# Patient Record
Sex: FEMALE | Race: WHITE | NOT HISPANIC OR LATINO | URBAN - METROPOLITAN AREA
[De-identification: names, ages, dates, MRNs, and addresses within clinical notes are randomized per-mention and may not be internally consistent; named-entity substitution may affect disease eponyms.]

---

## 2018-03-05 DIAGNOSIS — Z29.8 NEED FOR MALARIA PROPHYLAXIS: Primary | ICD-10-CM

## 2018-03-05 RX ORDER — ATOVAQUONE AND PROGUANIL HYDROCHLORIDE 250; 100 MG/1; MG/1
TABLET, FILM COATED ORAL
Qty: 19 TABLET | Refills: 0 | Status: SHIPPED | OUTPATIENT
Start: 2018-03-05 | End: 2023-03-07

## 2018-04-12 ENCOUNTER — OFFICE VISIT (OUTPATIENT)
Dept: SPORTS MEDICINE | Facility: CLINIC | Age: 28
End: 2018-04-12

## 2018-04-12 VITALS
SYSTOLIC BLOOD PRESSURE: 112 MMHG | WEIGHT: 142.8 LBS | DIASTOLIC BLOOD PRESSURE: 64 MMHG | BODY MASS INDEX: 24.38 KG/M2 | HEART RATE: 59 BPM | HEIGHT: 64 IN | OXYGEN SATURATION: 99 %

## 2018-04-12 DIAGNOSIS — G89.29 CHRONIC LEFT-SIDED THORACIC BACK PAIN: Primary | ICD-10-CM

## 2018-04-12 DIAGNOSIS — M99.08 SOMATIC DYSFUNCTION OF RIB: ICD-10-CM

## 2018-04-12 DIAGNOSIS — M54.6 CHRONIC LEFT-SIDED THORACIC BACK PAIN: Primary | ICD-10-CM

## 2018-04-12 DIAGNOSIS — M99.02 THORACIC REGION SOMATIC DYSFUNCTION: ICD-10-CM

## 2018-04-12 PROCEDURE — 99204 OFFICE O/P NEW MOD 45 MIN: CPT | Performed by: FAMILY MEDICINE

## 2018-04-12 PROCEDURE — 71101 X-RAY EXAM UNILAT RIBS/CHEST: CPT | Performed by: FAMILY MEDICINE

## 2018-04-12 PROCEDURE — 98925 OSTEOPATH MANJ 1-2 REGIONS: CPT | Performed by: FAMILY MEDICINE

## 2018-04-12 NOTE — PROGRESS NOTES
"Allyson is a 28 y.o. year old female    Chief Complaint   Patient presents with   • Thoracic Spine - Pain       History of Present Illness  Thoracic back pain: Acute on chronic.  Has been bothering her for the past year with no known trauma.  Primarily left sided and bad enough that it catches her breath.  She states that today's episode occurred as she was picking up her youngest daughter and then putting her down.  Pain wraps around to the front of her chest.  She states in the previous episodes, and will typically go away with rest within a few days.  No family history of autoimmune conditions.    I have reviewed the patient's medical history in detail and updated the computerized patient record.    Review of Systems   Constitutional: Negative for chills and fever.   Respiratory: Negative for chest tightness, shortness of breath and wheezing.    Cardiovascular: Negative for chest pain.   Musculoskeletal:        Per HPI   Neurological: Negative for weakness, numbness and headaches.   All other systems reviewed and are negative.      /64   Pulse 59   Ht 162.6 cm (64\")   Wt 64.8 kg (142 lb 12.8 oz)   SpO2 99%   BMI 24.51 kg/m²     Physical Exam    Vital signs reviewed.   General: No acute distress.  Eyes: conjunctiva clear; pupils equally round and reactive  ENT: external ears and nose atraumatic; oropharynx clear  CV: no peripheral edema, 2+ radial pulse  Resp: normal respiratory effort, no use of accessory muscles  Skin: no rashes or wounds; normal turgor  Psych: mood and affect appropriate; recent and remote memory intact  Neuro: Sensation to light touch intact; 2+ DTR L4, S1 bilateral    Osteopathic exam:  Tissue texture changes thoracic spine, ribs  T6-7 neutral, side bent right and rotated left  Left posterior rib 6, 7    Chest X-Ray  Indication: pain  Views: PA and ribs    Findings:  Normal lung markings.  No pleural effusion.  Heart size and shape are normal.  Mediastinum is normal.  No visible " rib fractures.   Overall, normal chest.    There were no previous studies available for comparison.    Procedure: osteopathic manipulative treatment    Risks, benefits and alternatives discussed with patient and verbal consent obtained. Area of maximal tenderness palpated and documented above. Treatment included: counterstrain, muscle energy, HVLA and myofascial release. Patient tolerated treatment well and demonstrated decreased tenderness, improved range of motion. Post treatment instructions given verbally.    Diagnoses and all orders for this visit:    Chronic left-sided thoracic back pain  -     XR Ribs Left With PA Chest    Thoracic region somatic dysfunction    Somatic dysfunction of rib      Reduce pain and improved range of motion following OMT.  Home exercise program thoracic back pain given.  Follow-up when necessary.

## 2023-03-07 ENCOUNTER — OFFICE VISIT (OUTPATIENT)
Dept: SPORTS MEDICINE | Facility: CLINIC | Age: 33
End: 2023-03-07

## 2023-03-07 VITALS
HEART RATE: 65 BPM | WEIGHT: 140 LBS | SYSTOLIC BLOOD PRESSURE: 116 MMHG | DIASTOLIC BLOOD PRESSURE: 80 MMHG | OXYGEN SATURATION: 97 % | BODY MASS INDEX: 23.9 KG/M2 | HEIGHT: 64 IN | TEMPERATURE: 97.8 F

## 2023-03-07 DIAGNOSIS — S62.609A: Primary | ICD-10-CM

## 2023-03-07 DIAGNOSIS — M25.532 LEFT WRIST PAIN: ICD-10-CM

## 2023-03-07 PROCEDURE — 99204 OFFICE O/P NEW MOD 45 MIN: CPT | Performed by: FAMILY MEDICINE

## 2023-03-07 RX ORDER — CELECOXIB 100 MG/1
100 CAPSULE ORAL 2 TIMES DAILY PRN
Qty: 60 CAPSULE | Refills: 0 | Status: SHIPPED | OUTPATIENT
Start: 2023-03-07

## 2023-03-07 NOTE — PROGRESS NOTES
"Allyson is a 33 y.o. year old female presents to [facility]    Chief Complaint   Patient presents with   • Wrist Pain     LT WRIST-hand got \"crushed\" spotting someone. 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  She was stunting and an athlete was falling as she was holding athlete up.  Unfortunately, this resulted in her left hand, wrist being hyperextended.  She felt an immediate pop.  Initial pain began in the ring finger.  She has since developed swelling along the back of the hand below the middle finger.  She does associate change in temperature of the hand as it feels \"cold\".  She does have full sensation of the tips of her fingers.  She has applied ice, taking ibuprofen.    I have reviewed the patient's medical, family, and social history in detail and updated the computerized patient record.    /80 (BP Location: Right arm, Patient Position: Sitting, Cuff Size: Adult)   Pulse 65   Temp 97.8 °F (36.6 °C) (Temporal)   Ht 162.6 cm (64.02\")   Wt 63.5 kg (140 lb)   SpO2 97%   BMI 24.02 kg/m²      Physical Exam    Mask worn thru encounter  Vital signs reviewed.   General: No acute distress.  Eyes: conjunctiva clear; pupils equally round and reactive  ENT: external ears atraumatic  CV: no peripheral edema  Resp: normal respiratory effort, no use of accessory muscles  Skin: no rashes or wounds; normal turgor  Psych: mood and affect appropriate; recent and remote memory intact  Neuro: sensation to light touch intact    MSK Exam  L hand, wrist: Neurovascular intact.  2+ pulses at the radial and ulnar arteries.  She does lack full motion of the hand secondary to pain and swelling along the dorsum of the third, fourth metacarpals.  There is bony tenderness along the fourth and fifth proximal phalanx.  No ecchymoses.  3/5  strength.    Left Wrist X-Ray  Indication: Pain  Views: AP, Lateral, " and Oblique    Findings:  Nondisplaced fracture of the proximal fourth, fifth phalanx without angulation  No bony lesion  Normal soft tissues  Normal joint spaces    No prior studies were available for comparison.               Diagnoses and all orders for this visit:    Closed nondisplaced fracture of phalanx of finger of left hand  -     celecoxib (CeleBREX) 100 MG capsule; Take 1 capsule by mouth 2 (Two) Times a Day As Needed for Mild Pain.    Left wrist pain  -     XR Wrist 3+ View Left  -     celecoxib (CeleBREX) 100 MG capsule; Take 1 capsule by mouth 2 (Two) Times a Day As Needed for Mild Pain.      Mechanism of injury matches fractures noted on exam, x-ray.  She was fitted for a boxer splint.  Fitted for Exos splint and instructions given for proper care at home. Patient information given in handout.  Follow-up in 2 weeks for reevaluation.  Repeat x-ray 4 weeks from now.      Follow Up   Return in about 2 weeks (around 3/21/2023) for Fx f/up.  Patient was given instructions and counseling regarding her condition or for health maintenance advice. Please see specific information pulled into the AVS if appropriate.     EMR Dragon/Transcription disclaimer:    Much of this encounter note is an electronic transcription/translation of spoken language to printed text.  The electronic translation of spoken language may permit erroneous, or at times, nonsensical words or phrases to be inadvertently transcribed.  Although I have reviewed the note for such errors some may still exist.

## 2023-03-21 ENCOUNTER — OFFICE VISIT (OUTPATIENT)
Dept: SPORTS MEDICINE | Facility: CLINIC | Age: 33
End: 2023-03-21

## 2023-03-21 ENCOUNTER — TELEPHONE (OUTPATIENT)
Dept: SPORTS MEDICINE | Facility: CLINIC | Age: 33
End: 2023-03-21
Payer: COMMERCIAL

## 2023-03-21 VITALS
WEIGHT: 140 LBS | OXYGEN SATURATION: 100 % | HEIGHT: 64 IN | SYSTOLIC BLOOD PRESSURE: 122 MMHG | DIASTOLIC BLOOD PRESSURE: 80 MMHG | HEART RATE: 69 BPM | BODY MASS INDEX: 23.9 KG/M2 | TEMPERATURE: 98.2 F | RESPIRATION RATE: 16 BRPM

## 2023-03-21 DIAGNOSIS — M79.642 LEFT HAND PAIN: Primary | ICD-10-CM

## 2023-03-21 DIAGNOSIS — S63.92XA HAND SPRAIN, LEFT, INITIAL ENCOUNTER: ICD-10-CM

## 2023-03-21 PROCEDURE — 99213 OFFICE O/P EST LOW 20 MIN: CPT | Performed by: FAMILY MEDICINE

## 2023-03-21 RX ORDER — DEXTROAMPHETAMINE SACCHARATE, AMPHETAMINE ASPARTATE MONOHYDRATE, DEXTROAMPHETAMINE SULFATE AND AMPHETAMINE SULFATE 6.25; 6.25; 6.25; 6.25 MG/1; MG/1; MG/1; MG/1
CAPSULE, EXTENDED RELEASE ORAL
COMMUNITY
Start: 2023-03-05

## 2023-03-21 NOTE — PROGRESS NOTES
"Allyson is a 33 y.o. year old female presents to [facility]    Chief Complaint   Patient presents with   • Left Hand - Follow-up     Left middle and ring finger.        History of Present Illness  DOI 3/6/2023.  Has been wearing Exos splint as recommended since last office visit.  Swelling has improved though she now localizes her pain to the base of the middle finger on the back of the hand.  No new symptoms.    I have reviewed the patient's medical, family, and social history in detail and updated the computerized patient record.    /80   Pulse 69   Temp 98.2 °F (36.8 °C)   Resp 16   Ht 162.6 cm (64\")   Wt 63.5 kg (140 lb)   SpO2 100%   BMI 24.03 kg/m²      Physical Exam    Mask worn thru encounter  Vital signs reviewed.   General: No acute distress.  Eyes: conjunctiva clear; pupils equally round and reactive  ENT: external ears atraumatic  CV: no peripheral edema  Resp: normal respiratory effort, no use of accessory muscles  Skin: no rashes or wounds; normal turgor  Psych: mood and affect appropriate; recent and remote memory intact  Neuro: sensation to light touch intact    MSK Exam  L hand: Swelling along the dorsum of the hand has resolved.  There is soft tissue tenderness along the shaft of the third metacarpal.  She does have full strength of all digits.    XR Wrist 3+ View Left (03/07/2023 10:26)  Review of prior x-ray as well as over read does not demonstrate any acute bony abnormality.  Perceived abnormality was more appropriately nutrient vessels to the fourth and fifth proximal phalanx             Diagnoses and all orders for this visit:    Left hand pain    Hand sprain, left, initial encounter    Other orders  -     amphetamine-dextroamphetamine XR (ADDERALL XR) 25 MG 24 hr capsule; TAKE 2 CAPSULES BY MOUTH IN THE MORNING      Reassurance given with prior x-ray that no fracture identified.  [facility] as she has found this " to be more comfortable day-to-day.  She can however take it off more often and work on hand rehab with soft stress ball to see if this will help alleviate some of the stiffness.  Follow-up in 2 weeks.  Consider MRI.      Follow Up   Return in about 2 weeks (around 4/4/2023) for Recheck.  Patient was given instructions and counseling regarding her condition or for health maintenance advice. Please see specific information pulled into the AVS if appropriate.     EMR Dragon/Transcription disclaimer:    Much of this encounter note is an electronic transcription/translation of spoken language to printed text.  The electronic translation of spoken language may permit erroneous, or at times, nonsensical words or phrases to be inadvertently transcribed.  Although I have reviewed the note for such errors some may still exist.

## 2023-03-21 NOTE — TELEPHONE ENCOUNTER
Patient was scheduled to be seen on March 7th.   (When appointment was made patient did not state that this was a workers comp issue.)  When patient came in on 03/07/2023. The patient did not have claim info for workers comp,  I did inform the patient that without that info that we would need to reschedule. I also explained without having the workers comp claim information we would be unable to verify coverage for the visit. Patient wanted to be seen and verbally stated that she was okay with using personal insurance to be seen in the office on 3/07/2023. Patient was seen and scheduled a follow up.    Today 03/21/2023 Patient arrived with claim information. I did take the patients information and created the claim/ attached to appointments. I did explain to the patient that I would update her appointments and chart. But if she had any questions that she may need to contact billing.    No further action needed at this time.    Sanjana